# Patient Record
Sex: FEMALE | Race: WHITE | URBAN - METROPOLITAN AREA
[De-identification: names, ages, dates, MRNs, and addresses within clinical notes are randomized per-mention and may not be internally consistent; named-entity substitution may affect disease eponyms.]

---

## 2017-06-14 ENCOUNTER — OFFICE VISIT (OUTPATIENT)
Dept: FAMILY MEDICINE CLINIC | Facility: CLINIC | Age: 27
End: 2017-06-14

## 2017-06-14 VITALS
TEMPERATURE: 98 F | RESPIRATION RATE: 15 BRPM | OXYGEN SATURATION: 96 % | HEART RATE: 104 BPM | SYSTOLIC BLOOD PRESSURE: 116 MMHG | WEIGHT: 187 LBS | DIASTOLIC BLOOD PRESSURE: 82 MMHG

## 2017-06-14 DIAGNOSIS — H00.14 CHALAZION OF LEFT UPPER EYELID: Primary | ICD-10-CM

## 2017-06-14 PROCEDURE — 99202 OFFICE O/P NEW SF 15 MIN: CPT | Performed by: PHYSICIAN ASSISTANT

## 2017-06-14 NOTE — PROGRESS NOTES
CHIEF COMPLAINT:   Patient presents with:  Sty: Pt c/o sty on LT upper eyelid X 2 weeks       HPI:   Monique Whalen is a 23year old female who presents with chief complaint for left upper eyelid swelling for 2 weeks. Occasional discharge.  +Painful to

## (undated) NOTE — MR AVS SNAPSHOT
[address]  [address] [facility] [address] [phone]               Thank you for choosing us for your health care visit with Tanesha Nguyễn PA-C.   We are glad to serve you and happy to provide you with this [facility]ma [facility] Dx:  Chalazion of left upper eyelid [H00.14]        Canceled Orders    OPHTHALMOLOGY - INTERNAL [[id number] CUSTOM]  Order #:  [id number]         **REFERRAL REQUEST**    Your physician has referred you to a specialist.  Your physician or the clinic sta not sign up before the expiration date, you must request a new code. Your unique SIMI Access Code: [id number]  Expires: 8/13/2017 11:02 AM    If you have questions, you can call [phone] to talk to our [facility] Staff.  Remember, [employer]